# Patient Record
Sex: MALE | Race: WHITE | NOT HISPANIC OR LATINO | Employment: OTHER | URBAN - METROPOLITAN AREA
[De-identification: names, ages, dates, MRNs, and addresses within clinical notes are randomized per-mention and may not be internally consistent; named-entity substitution may affect disease eponyms.]

---

## 2017-01-04 ENCOUNTER — APPOINTMENT (OUTPATIENT)
Dept: RADIOLOGY | Facility: MEDICAL CENTER | Age: 77
End: 2017-01-04
Attending: ORTHOPAEDIC SURGERY
Payer: MEDICARE

## 2017-01-04 PROBLEM — M51.36 DEGENERATION OF LUMBAR INTERVERTEBRAL DISC: Status: ACTIVE | Noted: 2017-01-04

## 2017-01-04 PROBLEM — M48.061 SPINAL STENOSIS, LUMBAR REGION, WITHOUT NEUROGENIC CLAUDICATION: Status: ACTIVE | Noted: 2017-01-04

## 2017-01-04 PROBLEM — M54.50 LUMBAGO: Status: ACTIVE | Noted: 2017-01-04

## 2017-01-04 PROCEDURE — 72020 X-RAY EXAM OF SPINE 1 VIEW: CPT

## 2017-01-04 PROCEDURE — 71010 DX-CHEST-LIMITED (1 VIEW): CPT

## 2019-06-13 ENCOUNTER — APPOINTMENT (OUTPATIENT)
Dept: RADIOLOGY | Facility: MEDICAL CENTER | Age: 79
End: 2019-06-13
Attending: INTERNAL MEDICINE
Payer: MEDICARE

## 2019-06-13 ENCOUNTER — APPOINTMENT (OUTPATIENT)
Dept: RADIOLOGY | Facility: MEDICAL CENTER | Age: 79
End: 2019-06-13
Attending: EMERGENCY MEDICINE
Payer: MEDICARE

## 2019-06-13 ENCOUNTER — APPOINTMENT (OUTPATIENT)
Dept: CARDIOLOGY | Facility: MEDICAL CENTER | Age: 79
End: 2019-06-13
Attending: INTERNAL MEDICINE
Payer: MEDICARE

## 2019-06-13 ENCOUNTER — HOSPITAL ENCOUNTER (OUTPATIENT)
Facility: MEDICAL CENTER | Age: 79
End: 2019-06-14
Attending: EMERGENCY MEDICINE | Admitting: INTERNAL MEDICINE
Payer: MEDICARE

## 2019-06-13 DIAGNOSIS — R42 DIZZINESS: ICD-10-CM

## 2019-06-13 DIAGNOSIS — R55 NEAR SYNCOPE: ICD-10-CM

## 2019-06-13 DIAGNOSIS — H53.9 VISUAL CHANGES: ICD-10-CM

## 2019-06-13 LAB
ALBUMIN SERPL BCP-MCNC: 4.5 G/DL (ref 3.2–4.9)
ALBUMIN/GLOB SERPL: 1.4 G/DL
ALP SERPL-CCNC: 90 U/L (ref 30–99)
ALT SERPL-CCNC: 17 U/L (ref 2–50)
ANION GAP SERPL CALC-SCNC: 12 MMOL/L (ref 0–11.9)
APPEARANCE UR: CLEAR
APTT PPP: 26.3 SEC (ref 24.7–36)
AST SERPL-CCNC: 19 U/L (ref 12–45)
BASOPHILS # BLD AUTO: 0.3 % (ref 0–1.8)
BASOPHILS # BLD: 0.02 K/UL (ref 0–0.12)
BILIRUB SERPL-MCNC: 0.6 MG/DL (ref 0.1–1.5)
BILIRUB UR QL STRIP.AUTO: NEGATIVE
BUN SERPL-MCNC: 18 MG/DL (ref 8–22)
CALCIUM SERPL-MCNC: 9.7 MG/DL (ref 8.5–10.5)
CHLORIDE SERPL-SCNC: 101 MMOL/L (ref 96–112)
CO2 SERPL-SCNC: 27 MMOL/L (ref 20–33)
COLOR UR: YELLOW
CREAT SERPL-MCNC: 1.3 MG/DL (ref 0.5–1.4)
D DIMER PPP IA.FEU-MCNC: 1.02 UG/ML (FEU) (ref 0–0.5)
EKG IMPRESSION: NORMAL
EOSINOPHIL # BLD AUTO: 0.05 K/UL (ref 0–0.51)
EOSINOPHIL NFR BLD: 0.8 % (ref 0–6.9)
ERYTHROCYTE [DISTWIDTH] IN BLOOD BY AUTOMATED COUNT: 45.3 FL (ref 35.9–50)
GLOBULIN SER CALC-MCNC: 3.3 G/DL (ref 1.9–3.5)
GLUCOSE SERPL-MCNC: 95 MG/DL (ref 65–99)
GLUCOSE UR STRIP.AUTO-MCNC: NEGATIVE MG/DL
HCT VFR BLD AUTO: 51.5 % (ref 42–52)
HGB BLD-MCNC: 16.8 G/DL (ref 14–18)
IMM GRANULOCYTES # BLD AUTO: 0.03 K/UL (ref 0–0.11)
IMM GRANULOCYTES NFR BLD AUTO: 0.5 % (ref 0–0.9)
INR PPP: 0.94 (ref 0.87–1.13)
KETONES UR STRIP.AUTO-MCNC: NEGATIVE MG/DL
LEUKOCYTE ESTERASE UR QL STRIP.AUTO: NEGATIVE
LYMPHOCYTES # BLD AUTO: 1.32 K/UL (ref 1–4.8)
LYMPHOCYTES NFR BLD: 20.1 % (ref 22–41)
MAGNESIUM SERPL-MCNC: 2.2 MG/DL (ref 1.5–2.5)
MCH RBC QN AUTO: 29.9 PG (ref 27–33)
MCHC RBC AUTO-ENTMCNC: 32.6 G/DL (ref 33.7–35.3)
MCV RBC AUTO: 91.8 FL (ref 81.4–97.8)
MICRO URNS: NORMAL
MONOCYTES # BLD AUTO: 0.65 K/UL (ref 0–0.85)
MONOCYTES NFR BLD AUTO: 9.9 % (ref 0–13.4)
NEUTROPHILS # BLD AUTO: 4.49 K/UL (ref 1.82–7.42)
NEUTROPHILS NFR BLD: 68.4 % (ref 44–72)
NITRITE UR QL STRIP.AUTO: NEGATIVE
NRBC # BLD AUTO: 0 K/UL
NRBC BLD-RTO: 0 /100 WBC
PH UR STRIP.AUTO: 7 [PH]
PLATELET # BLD AUTO: 223 K/UL (ref 164–446)
PMV BLD AUTO: 8.5 FL (ref 9–12.9)
POTASSIUM SERPL-SCNC: 4.2 MMOL/L (ref 3.6–5.5)
PROT SERPL-MCNC: 7.8 G/DL (ref 6–8.2)
PROT UR QL STRIP: NEGATIVE MG/DL
PROTHROMBIN TIME: 12.7 SEC (ref 12–14.6)
RBC # BLD AUTO: 5.61 M/UL (ref 4.7–6.1)
RBC UR QL AUTO: NEGATIVE
SODIUM SERPL-SCNC: 140 MMOL/L (ref 135–145)
SP GR UR STRIP.AUTO: 1.03
TROPONIN I SERPL-MCNC: <0.01 NG/ML (ref 0–0.04)
UROBILINOGEN UR STRIP.AUTO-MCNC: 0.2 MG/DL
WBC # BLD AUTO: 6.6 K/UL (ref 4.8–10.8)

## 2019-06-13 PROCEDURE — 81003 URINALYSIS AUTO W/O SCOPE: CPT

## 2019-06-13 PROCEDURE — 700117 HCHG RX CONTRAST REV CODE 255: Performed by: INTERNAL MEDICINE

## 2019-06-13 PROCEDURE — 95951 EEG: CPT | Mod: 52 | Performed by: PSYCHIATRY & NEUROLOGY

## 2019-06-13 PROCEDURE — 99285 EMERGENCY DEPT VISIT HI MDM: CPT

## 2019-06-13 PROCEDURE — 93005 ELECTROCARDIOGRAM TRACING: CPT | Performed by: EMERGENCY MEDICINE

## 2019-06-13 PROCEDURE — 700102 HCHG RX REV CODE 250 W/ 637 OVERRIDE(OP): Performed by: INTERNAL MEDICINE

## 2019-06-13 PROCEDURE — 70553 MRI BRAIN STEM W/O & W/DYE: CPT

## 2019-06-13 PROCEDURE — 93880 EXTRACRANIAL BILAT STUDY: CPT | Mod: 26 | Performed by: INTERNAL MEDICINE

## 2019-06-13 PROCEDURE — G0378 HOSPITAL OBSERVATION PER HR: HCPCS

## 2019-06-13 PROCEDURE — 95951 EEG: CPT | Mod: 26,52 | Performed by: PSYCHIATRY & NEUROLOGY

## 2019-06-13 PROCEDURE — 84484 ASSAY OF TROPONIN QUANT: CPT | Mod: 91

## 2019-06-13 PROCEDURE — 93880 EXTRACRANIAL BILAT STUDY: CPT

## 2019-06-13 PROCEDURE — 71045 X-RAY EXAM CHEST 1 VIEW: CPT

## 2019-06-13 PROCEDURE — 71275 CT ANGIOGRAPHY CHEST: CPT

## 2019-06-13 PROCEDURE — 85025 COMPLETE CBC W/AUTO DIFF WBC: CPT

## 2019-06-13 PROCEDURE — 36415 COLL VENOUS BLD VENIPUNCTURE: CPT

## 2019-06-13 PROCEDURE — A9270 NON-COVERED ITEM OR SERVICE: HCPCS | Performed by: INTERNAL MEDICINE

## 2019-06-13 PROCEDURE — 99220 PR INITIAL OBSERVATION CARE,LEVL III: CPT | Performed by: INTERNAL MEDICINE

## 2019-06-13 PROCEDURE — 93005 ELECTROCARDIOGRAM TRACING: CPT

## 2019-06-13 PROCEDURE — 85730 THROMBOPLASTIN TIME PARTIAL: CPT

## 2019-06-13 PROCEDURE — 80053 COMPREHEN METABOLIC PANEL: CPT

## 2019-06-13 PROCEDURE — 83735 ASSAY OF MAGNESIUM: CPT

## 2019-06-13 PROCEDURE — 700105 HCHG RX REV CODE 258: Performed by: INTERNAL MEDICINE

## 2019-06-13 PROCEDURE — 85610 PROTHROMBIN TIME: CPT

## 2019-06-13 PROCEDURE — 70450 CT HEAD/BRAIN W/O DYE: CPT

## 2019-06-13 PROCEDURE — A9585 GADOBUTROL INJECTION: HCPCS | Performed by: INTERNAL MEDICINE

## 2019-06-13 PROCEDURE — 85379 FIBRIN DEGRADATION QUANT: CPT

## 2019-06-13 RX ORDER — ACETAMINOPHEN 325 MG/1
650 TABLET ORAL EVERY 6 HOURS PRN
Status: DISCONTINUED | OUTPATIENT
Start: 2019-06-13 | End: 2019-06-14 | Stop reason: HOSPADM

## 2019-06-13 RX ORDER — BISACODYL 10 MG
10 SUPPOSITORY, RECTAL RECTAL
Status: DISCONTINUED | OUTPATIENT
Start: 2019-06-13 | End: 2019-06-14 | Stop reason: HOSPADM

## 2019-06-13 RX ORDER — ENALAPRILAT 1.25 MG/ML
1.25 INJECTION INTRAVENOUS EVERY 6 HOURS PRN
Status: DISCONTINUED | OUTPATIENT
Start: 2019-06-13 | End: 2019-06-14 | Stop reason: HOSPADM

## 2019-06-13 RX ORDER — ONDANSETRON 2 MG/ML
4 INJECTION INTRAMUSCULAR; INTRAVENOUS EVERY 4 HOURS PRN
Status: DISCONTINUED | OUTPATIENT
Start: 2019-06-13 | End: 2019-06-14 | Stop reason: HOSPADM

## 2019-06-13 RX ORDER — ONDANSETRON 4 MG/1
4 TABLET, ORALLY DISINTEGRATING ORAL EVERY 4 HOURS PRN
Status: DISCONTINUED | OUTPATIENT
Start: 2019-06-13 | End: 2019-06-14 | Stop reason: HOSPADM

## 2019-06-13 RX ORDER — SODIUM CHLORIDE, SODIUM LACTATE, POTASSIUM CHLORIDE, AND CALCIUM CHLORIDE .6; .31; .03; .02 G/100ML; G/100ML; G/100ML; G/100ML
1000 INJECTION, SOLUTION INTRAVENOUS ONCE
Status: COMPLETED | OUTPATIENT
Start: 2019-06-13 | End: 2019-06-13

## 2019-06-13 RX ORDER — DIMENHYDRINATE 50 MG
50 TABLET ORAL EVERY 6 HOURS
Status: ON HOLD | COMMUNITY
End: 2019-06-14

## 2019-06-13 RX ORDER — CETIRIZINE HYDROCHLORIDE 10 MG/1
10 TABLET ORAL 2 TIMES DAILY PRN
COMMUNITY

## 2019-06-13 RX ORDER — POLYETHYLENE GLYCOL 3350 17 G/17G
1 POWDER, FOR SOLUTION ORAL
Status: DISCONTINUED | OUTPATIENT
Start: 2019-06-13 | End: 2019-06-14 | Stop reason: HOSPADM

## 2019-06-13 RX ORDER — SODIUM CHLORIDE, SODIUM LACTATE, POTASSIUM CHLORIDE, CALCIUM CHLORIDE 600; 310; 30; 20 MG/100ML; MG/100ML; MG/100ML; MG/100ML
INJECTION, SOLUTION INTRAVENOUS CONTINUOUS
Status: DISCONTINUED | OUTPATIENT
Start: 2019-06-13 | End: 2019-06-14 | Stop reason: HOSPADM

## 2019-06-13 RX ORDER — AMOXICILLIN 250 MG
2 CAPSULE ORAL 2 TIMES DAILY
Status: DISCONTINUED | OUTPATIENT
Start: 2019-06-13 | End: 2019-06-14 | Stop reason: HOSPADM

## 2019-06-13 RX ORDER — MECLIZINE HYDROCHLORIDE 25 MG/1
25 TABLET ORAL 3 TIMES DAILY
Status: DISCONTINUED | OUTPATIENT
Start: 2019-06-13 | End: 2019-06-14 | Stop reason: HOSPADM

## 2019-06-13 RX ORDER — GADOBUTROL 604.72 MG/ML
7.5 INJECTION INTRAVENOUS ONCE
Status: COMPLETED | OUTPATIENT
Start: 2019-06-13 | End: 2019-06-13

## 2019-06-13 RX ADMIN — GADOBUTROL 7.5 ML: 604.72 INJECTION INTRAVENOUS at 15:46

## 2019-06-13 RX ADMIN — MECLIZINE HYDROCHLORIDE 25 MG: 25 TABLET ORAL at 18:12

## 2019-06-13 RX ADMIN — IOHEXOL 75 ML: 350 INJECTION, SOLUTION INTRAVENOUS at 14:00

## 2019-06-13 RX ADMIN — SODIUM CHLORIDE, POTASSIUM CHLORIDE, SODIUM LACTATE AND CALCIUM CHLORIDE 1000 ML: 600; 310; 30; 20 INJECTION, SOLUTION INTRAVENOUS at 11:55

## 2019-06-13 RX ADMIN — SODIUM CHLORIDE, POTASSIUM CHLORIDE, SODIUM LACTATE AND CALCIUM CHLORIDE: 600; 310; 30; 20 INJECTION, SOLUTION INTRAVENOUS at 13:00

## 2019-06-13 RX ADMIN — SODIUM CHLORIDE, POTASSIUM CHLORIDE, SODIUM LACTATE AND CALCIUM CHLORIDE: 600; 310; 30; 20 INJECTION, SOLUTION INTRAVENOUS at 23:57

## 2019-06-13 ASSESSMENT — PATIENT HEALTH QUESTIONNAIRE - PHQ9
2. FEELING DOWN, DEPRESSED, IRRITABLE, OR HOPELESS: NOT AT ALL
SUM OF ALL RESPONSES TO PHQ9 QUESTIONS 1 AND 2: 0
1. LITTLE INTEREST OR PLEASURE IN DOING THINGS: NOT AT ALL

## 2019-06-13 ASSESSMENT — LIFESTYLE VARIABLES
EVER_SMOKED: YES
EVER_SMOKED: YES
DO YOU DRINK ALCOHOL: NO

## 2019-06-13 NOTE — PROCEDURES
VIDEO ELECTROENCEPHALOGRAM REPORT      Referring provider: Dr. Ewing.    DOS: 6/13/2019 (total recording of 29 minutes).     INDICATION:  Tao Petit 79 y.o. male presenting with syncope, dizziness.     CURRENT ANTIEPILEPTIC REGIMEN: None.     TECHNIQUE: 30 channel video electroencephalogram (EEG) was performed in accordance with the international 10-20 system. The study was reviewed in bipolar and referential montages. The recording examined the patient during wakeful and drowsy state(s).     DESCRIPTION OF THE RECORD:  During the wakefulness, the background showed a symmetrical 12 Hz alpha activity posteriorly with amplitude of 70 mV.  There was reactivity to eye closure/opening.  A normal anterior-posterior gradient was noted with faster beta frequencies seen anteriorly.  During drowsiness, theta frequencies were seen.    ACTIVATION PROCEDURES:   Intermittent Photic stimulation was performed in a stepwise fashion from 1 to 30 Hz and elicited a normal response (photic driving), most noticeable in the posterior leads.    ICTAL AND/OR INTERICTAL FINDINGS:   No focal or generalized epileptiform activity noted. No regional slowing was seen during this routine study.  No clinical events or seizures were reported or recorded during the study.     EKG: sampling of the EKG recording demonstrated a mild sinus arhythmia.     EVENTS:  None.     INTERPRETATION:  This is a normal video EEG recording in the awake and drowsy state(s).  Clinical correlation is recommended.    Note: A normal EEG does not rule out epilepsy.  If the clinical suspicion remains high for seizures, a prolonged recording to capture clinical or subclinical events may be helpful.    A MILD SINUS ARRHYTHMIA WAS CAPTURED ON EKG LEAD.       Denilson Carlin MD   Epilepsy and Neurodiagnostics.   Clinical  of Neurology [facility].   Diplomate in Neurology, Epilepsy, and Electrodiagnostic  Medicine.   Office: [phone]  Fax: [phone]

## 2019-06-13 NOTE — ED NOTES
Med Rec completed per patient  Allergies reviewed  No ORAL antibiotics in last 14 days    Patient states that he does not take any prescription medications at home

## 2019-06-13 NOTE — PROGRESS NOTES
Assessment completed. Pt A&Ox4. Respirations are even and unlabored on RA. Pt denies pain at this time. Monitors applied, +orthostatic BP (notified Dr. Ewing), call light and belongings within reach. POC updated (echo, EEG, carotid, CT chest, PT/OT, labs, fluids, urine). Pt educated on room and call light, pt verbalized understanding. Communication board updated. Needs met. Boxed lunch provided.

## 2019-06-13 NOTE — H&P
Hospital Medicine History & Physical Note    Date of Service  6/13/2019    Primary Care Physician  Pcp Pt States None    Consultants  none    Code Status  Full    Chief Complaint  Syncope, dizziness    History of Presenting Illness  79 y.o. Male aleks, who has worked in the [employer] for as long as he can remember, and unfortunately has had multiple head traumas and falls due to his line of work, resulting to being diagnosed with Ménière's disease about 20 years ago.  He states he had attacks of vertigo, with total deafness of the left ear and 50% hearing loss on the right after treatment for Ménière's disease.    According to the patient, and his daughter, in the last 1 week, the patient was feeling very dizzy, with sensation of floating, and head congestion, along with more frequent vertigo worse with head movement and rising fast. He was also noted to have poor balance.  He had one episodes of syncope, where he totally blacked out, but the patient could not remember this.  The daughter also noticed that he has been having more frequent mood swings, and has been angrier than usual.  He denies any focal weakness or numbness, or any speech changes.  Otherwise, he does not have any other complaints such as fevers, chills abdominal pain, dysuria, diarrhea, chest pain, shortness of breath, or cough.  He states he eats and drinks well.  Due to the prodding of his daughter and friend, he then decided to go to the ED to be evaluated.    ED course:  The patient was initially evaluated.  Vital signs are stable.  Blood work-up are unremarkable.  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  Chest x-ray (my read) showed no consolidation or effusion.  EKG showed normal sinus rhythm (personally reviewed).  Patient was subsequently admitted for further evaluation.    Review of Systems  ROS     Pertinent positives/negatives as mentioned above.     A complete review of  systems was personally done by me. All other systems were negative.       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    Surgical History   has a past surgical history that includes chest tube insertion (2/2013); appendectomy (1956); inguinal hernia repair (1960's); irrigation & debridement ortho (Left, 8/3/2016); and lumbar decompression (1/4/2017).     Family History  Reviewed. Non-contributory.       Social History   reports that he quit smoking about 54 years ago. His smokeless tobacco use includes Chew. He reports that he drinks about 3.5 oz of alcohol per week . He reports that he does not use drugs.    Allergies  Allergies   Allergen Reactions   • Oxycodone Unspecified     dizzy   • Percocet [Apap-Fd&C Red #40 Al Ramirez-Oxycodone]        Medications  Prior to Admission Medications   Prescriptions Last Dose Informant Patient Reported? Taking?   Multiple Vitamins-Minerals (CENTRUM SILVER 50+MEN) Tab FEW DAYS AGO at UNK Patient Yes Yes   Sig: Take 1 Tab by mouth every day.   cetirizine (ZYRTEC) 10 MG Tab 6/12/2019 at AM Patient Yes Yes   Sig: Take 10 mg by mouth 2 times a day as needed for Allergies.   dimenhyDRINATE (DRAMAMINE) 50 MG Tab 6/12/2019 at PRN Patient Yes Yes   Sig: Take 50 mg by mouth every 6 hours.      Facility-Administered Medications: None       Physical Exam  Temp:  [36.6 °C (97.9 °F)] 36.6 °C (97.9 °F)  Pulse:  [76-96] 76  Resp:  [14-31] 31  BP: (149)/(97) 149/97  SpO2:  [92 %-96 %] 92 %    Physical Exam   Constitutional: He is oriented to person, place, and time. He appears well-developed and well-nourished. No distress.   HENT:   Head: Normocephalic and atraumatic.   Mouth/Throat: Oropharynx is clear and moist. No oropharyngeal exudate.   Eyes: Pupils are equal, round, and reactive to light.  EOM are normal. Right eye exhibits no discharge. Left eye exhibits no discharge. No scleral icterus.   Neck: Normal range of motion. Neck supple.   Cardiovascular: Normal rate and regular rhythm.  Exam reveals no gallop and no friction rub.    No murmur heard.  Pulmonary/Chest: Effort normal and breath sounds normal. He has no wheezes. He has no rales. He exhibits no tenderness.   Abdominal: Soft. Bowel sounds are normal. There is no tenderness. There is no rebound and no guarding.   Musculoskeletal: Normal range of motion. He exhibits deformity ( Missing left thumb). He exhibits no edema or tenderness.   Lymphadenopathy:     He has no cervical adenopathy.   Neurological: He is alert and oriented to person, place, and time. No cranial nerve deficit.   No pronator drift  No facial droop  Motor 5/5 in all 4 extremity  Sensory grossly intact   Skin: Skin is warm and dry. No rash noted. He is not diaphoretic. No erythema.   Psychiatric: He has a normal mood and affect. His behavior is normal. Thought content normal.   Vitals reviewed.      Laboratory:  Recent Labs      06/13/19   0906   WBC  6.6   RBC  5.61   HEMOGLOBIN  16.8   HEMATOCRIT  51.5   MCV  91.8   MCH  29.9   MCHC  32.6*   RDW  45.3   PLATELETCT  223   MPV  8.5*     Recent Labs      06/13/19   0906   SODIUM  140   POTASSIUM  4.2   CHLORIDE  101   CO2  27   GLUCOSE  95   BUN  18   CREATININE  1.30   CALCIUM  9.7     Recent Labs      06/13/19   0906   ALTSGPT  17   ASTSGOT  19   ALKPHOSPHAT  90   TBILIRUBIN  0.6   GLUCOSE  95     Recent Labs      06/13/19   0906   APTT  26.3   INR  0.94             Recent Labs      06/13/19   0906   TROPONINI  <0.01       Urinalysis:    No results found     Imaging:  DX-CHEST-PORTABLE (1 VIEW)   Final Result      No consolidation.      CT-HEAD W/O   Final Result      1.  There is no acute intracranial process.   2.  There is a stable incidental right middle cranial fossa arachnoid cyst.   3.  There is mild age-related cerebral  "atrophy and white matter disease.      EC-ECHOCARDIOGRAM COMPLETE W/O CONT    (Results Pending)   MR-BRAIN-WITH    (Results Pending)   US-CAROTID DOPPLER BILAT    (Results Pending)         Assessment/Plan:  I anticipate this patient is appropriate for observation status at this time.    * Syncope, dizziness- (present on admission)   Assessment & Plan    -Unclear etiology, but I strongly suspect that this is due to his underlying Ménière's disease.  He did pass out once, with some component of lightheadedness, and so I would further work him up for other causes.  -I will obtain an MRI of the brain to rule out small stroke, and to reevaluate his arachnoid cyst.  I will also obtain an EEG.  For his syncope, I will obtain an echocardiogram, and carotid ultrasound, and check troponins and d-dimer.  -I will obtain an orthostatic vital signs.  For now I will hydrate him with lactated Ringer at 125 cc/h.  I will also start him on scheduled meclizine, to see if this will help control his vertigo.  -We will monitor him on telemetry to rule out any arrhythmia.  -I will get PT and OT to evaluate him.     Meniere disease, bilateral- (present on admission)   Assessment & Plan    - He takes over-the-counter \" seasick pills\", and had treatment which \" killed\" his left ear.  Suspect this is likely due to multiple head trauma from being in the [place].  -Start scheduled meclizine TID.  -PT/OT eval.          VTE prophylaxis: SCD  "

## 2019-06-13 NOTE — ASSESSMENT & PLAN NOTE
"- He takes over-the-counter \" seasick pills\", and had treatment which \" killed\" his left ear.  Suspect this is likely due to multiple head trauma from being in the [place].  -Start scheduled meclizine TID.  -PT/OT eval.   "

## 2019-06-13 NOTE — ED PROVIDER NOTES
"ED Provider Note    CHIEF COMPLAINT  Chief Complaint   Patient presents with   • Near Syncopal       HPI  Tao Petit is a 79 y.o. male who ambulates to the emergency department through triage with a friend for near syncopal episodes.  Patient comes with a narrative from his daughter who was unable to attend due to work, patient concurs with most in this narrative which describes although somewhat chronic history for intermittent vertigo, patient with 10 days of increasing dizziness, lightheadedness and near syncope.  Often catching himself before he falls when changing positions or standing up.  Patient describes blurred vision, difficulty focusing.  Denies headache, neck pain.  However patient states that he has moments where he has some sensation is bringing the induces vertigo and almost a stiffening motion.  Denies slurred speech or aphasia.  Denies extremity weakness or paresthesias.  Denies chest pain, palpitations or shortness of breath.  Denies abdominal pain or back pain.    Reported history of a \"tumor or cyst behind his eye\" initially diagnosed early 2000's, surgery recommended but patient and his wife declined.    REVIEW OF SYSTEMS  See HPI for further details. All other systems are negative.     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    SOCIAL HISTORY  Social History     Social History Main Topics   • Smoking status: Former Smoker     Quit date: 3/1/1965   • Smokeless tobacco: Current User     Types: Chew   • Alcohol use 3.5 oz/week     7 Cans of beer per week      Comment: 1 beer per night   • Drug use: No   • Sexual activity: Not on file      Comment:        SURGICAL HISTORY   has a past surgical history that includes chest tube insertion (2/2013); appendectomy (1956); inguinal " hernia repair (1960's); irrigation & debridement ortho (Left, 8/3/2016); and lumbar decompression (1/4/2017).    CURRENT MEDICATIONS  Home Medications     Reviewed by Ian Dimas (Pharmacy Tech) on 06/13/19 at 1044  Med List Status: Complete   Medication Last Dose Status   cetirizine (ZYRTEC) 10 MG Tab 6/12/2019 Active   dimenhyDRINATE (DRAMAMINE) 50 MG Tab 6/12/2019 Active   Multiple Vitamins-Minerals (CENTRUM SILVER 50+MEN) Tab FEW DAYS AGO Active                ALLERGIES  Allergies   Allergen Reactions   • Oxycodone Unspecified     dizzy   • Percocet [Apap-Fd&C Red #40 Al Ramirez-Oxycodone]        PHYSICAL EXAM  VITAL SIGNS: /97   Pulse 76   Temp 36.6 °C (97.9 °F) (Temporal)   Resp (!) 31   Ht 1.829 m (6')   Wt 81.6 kg (180 lb)   SpO2 92%   BMI 24.41 kg/m²   Pulse ox interpretation: I interpret this pulse ox as normal.  Constitutional: Alert in no apparent distress.  HENT: Normocephalic, atraumatic. Bilateral external ears normal, Nose normal. Moist mucous membranes.    Eyes: Pupils are equal and reactive, Conjunctiva normal.  EOMI.  No nystagmus.  Neck: Normal range of motion, Supple, non-tender.  No meningeal irritation.  Lymphatic: No lymphadenopathy noted.   Cardiovascular: Regular rate and rhythm, no murmurs. Distal pulses intact.    Thorax & Lungs: Normal breath sounds.  No wheezing/rales/ronchi. No increased work of breathing  Abdomen: Soft, non-distended, non-tender to palpation. No palpable or pulsatile masses.  Skin: Warm, Dry  Musculoskeletal: Good range of motion in all major joints.   Neurologic: Alert and oriented x4.  Speech clear and cohesive.  Cranial nerves II through XII intact bilaterally.  5 out of 5 strength in 4 extremities with proximal distal muscle groups.  No pronator drift.  Heel-to-shin intact bilaterally.  Psychiatric: Flat affect otherwise cooperative and judgment normal, Mood normal.       DIAGNOSTIC STUDIES / PROCEDURES  LABS  Results for orders placed or  performed during the hospital encounter of 06/13/19   CBC WITH DIFFERENTIAL   Result Value Ref Range    WBC 6.6 4.8 - 10.8 K/uL    RBC 5.61 4.70 - 6.10 M/uL    Hemoglobin 16.8 14.0 - 18.0 g/dL    Hematocrit 51.5 42.0 - 52.0 %    MCV 91.8 81.4 - 97.8 fL    MCH 29.9 27.0 - 33.0 pg    MCHC 32.6 (L) 33.7 - 35.3 g/dL    RDW 45.3 35.9 - 50.0 fL    Platelet Count 223 164 - 446 K/uL    MPV 8.5 (L) 9.0 - 12.9 fL    Neutrophils-Polys 68.40 44.00 - 72.00 %    Lymphocytes 20.10 (L) 22.00 - 41.00 %    Monocytes 9.90 0.00 - 13.40 %    Eosinophils 0.80 0.00 - 6.90 %    Basophils 0.30 0.00 - 1.80 %    Immature Granulocytes 0.50 0.00 - 0.90 %    Nucleated RBC 0.00 /100 WBC    Neutrophils (Absolute) 4.49 1.82 - 7.42 K/uL    Lymphs (Absolute) 1.32 1.00 - 4.80 K/uL    Monos (Absolute) 0.65 0.00 - 0.85 K/uL    Eos (Absolute) 0.05 0.00 - 0.51 K/uL    Baso (Absolute) 0.02 0.00 - 0.12 K/uL    Immature Granulocytes (abs) 0.03 0.00 - 0.11 K/uL    NRBC (Absolute) 0.00 K/uL   COMP METABOLIC PANEL   Result Value Ref Range    Sodium 140 135 - 145 mmol/L    Potassium 4.2 3.6 - 5.5 mmol/L    Chloride 101 96 - 112 mmol/L    Co2 27 20 - 33 mmol/L    Anion Gap 12.0 (H) 0.0 - 11.9    Glucose 95 65 - 99 mg/dL    Bun 18 8 - 22 mg/dL    Creatinine 1.30 0.50 - 1.40 mg/dL    Calcium 9.7 8.5 - 10.5 mg/dL    AST(SGOT) 19 12 - 45 U/L    ALT(SGPT) 17 2 - 50 U/L    Alkaline Phosphatase 90 30 - 99 U/L    Total Bilirubin 0.6 0.1 - 1.5 mg/dL    Albumin 4.5 3.2 - 4.9 g/dL    Total Protein 7.8 6.0 - 8.2 g/dL    Globulin 3.3 1.9 - 3.5 g/dL    A-G Ratio 1.4 g/dL   TROPONIN   Result Value Ref Range    Troponin I <0.01 0.00 - 0.04 ng/mL   PROTHROMBIN TIME   Result Value Ref Range    PT 12.7 12.0 - 14.6 sec    INR 0.94 0.87 - 1.13   APTT   Result Value Ref Range    APTT 26.3 24.7 - 36.0 sec   ESTIMATED GFR   Result Value Ref Range    GFR If African American >60 >60 mL/min/1.73 m 2    GFR If Non  53 (A) >60 mL/min/1.73 m 2   EKG   Result Value Ref Range     Report       [facility] Emergency Dept.    Test Date:  2019  Pt Name:    FELIX DOMINGUEZ             Department: ER  MRN:        [id number]                      Room:        06  Gender:     Male                         Technician: [id number]  :        [id number]                   Requested By:ER TRIAGE PROTOCOL  Order #:    [id number]                    Reading MD: JED MORALES DO    Measurements  Intervals                                Axis  Rate:       90                           P:          66  ME:         148                          QRS:        46  QRSD:       88                           T:          71  QT:         352  QTc:        431    Interpretive Statements  SINUS RHYTHM  Compared to ECG 2017 10:44:56  No significant changes    Electronically Signed On 2019 11:13:54 PDT by JED MORALES DO       RADIOLOGY  DX-CHEST-PORTABLE (1 VIEW)   Final Result      No consolidation.      CT-HEAD W/O   Final Result      1.  There is no acute intracranial process.   2.  There is a stable incidental right middle cranial fossa arachnoid cyst.   3.  There is mild age-related cerebral atrophy and white matter disease.      EC-ECHOCARDIOGRAM COMPLETE W/O CONT    (Results Pending)       COURSE & MEDICAL DECISION MAKING  Nursing notes and vital signs were reviewed. (See chart for details)  The patients records were reviewed, history was obtained from the patient;    ED evaluation for recurrent near syncope and dizziness is unrevealing.  Physical exam is quite unremarkable, although patient is symptomatic when going from lying to sitting or with sudden head movements, he is otherwise neurologically intact and nonfocal.  No nystagmus.  Labs are unremarkable.  CT head is within normal limits and unchanged from previous.  EKG within normal limits, continues telemetry without ectopy or arrhythmia.  Chest x-ray within normal limits.  Symptomatology, although with distant history for  somewhat of the same, acutely worse in the last 10 days.  Cannot exclude benign positional vertigo, however given age and symptomatology I feel as though further evaluation is warranted.  Cardiac evaluation, continuous monitoring and possible even MRI of the brain.  Patient will be admitted to the hospitalist for further evaluation and treatment.  He and his family member aware the findings and agreeable to the disposition plan.    1105 - Dr. Ewing is aware the patient agreeable to admission.    FINAL IMPRESSION  (R55) Near syncope, recurrent  (H53.9) Visual changes  (R42) Dizziness      Electronically signed by: Chasity Pierce, 6/13/2019 9:25 AM      This dictation was created using voice recognition software. The accuracy of the dictation is limited to the abilities of the software. I expect there may be some errors of grammar and possibly content. The nursing notes were reviewed and certain aspects of this information were incorporated into this note.

## 2019-06-13 NOTE — ED NOTES
"Agree with triage assessment, no changes noted. Pt reports \"I've been having vision problems and dizziness for 10 days.\" Pt reports pain of 0 out of 10. Pt hooked to monitor. Pt denies any further needs at this time. Call light within reach. Bed in low locked position. Comfort measures provided.   "

## 2019-06-13 NOTE — ED TRIAGE NOTES
"Pt bib friend. Pt has had near syncopal episodes for approx 1 week. Pt's daughter wrote a note regarding pt's symptoms. 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 Pt also with hx of possible tumor behind right eye. Pt A&Ox4.   "

## 2019-06-13 NOTE — CARE PLAN
Problem: Knowledge Deficit  Goal: Knowledge of disease process/condition, treatment plan, diagnostic tests, and medications will improve  Outcome: PROGRESSING AS EXPECTED  Updated on POC.

## 2019-06-13 NOTE — DISCHARGE PLANNING
Care Transition Team Assessment    Spoke with patient at bedside Anticipate no needs @ present time. Friend will be ride @ D/C.    Information Source  Orientation : Oriented x 4  Information Given By: Patient    Readmission Evaluation  Is this a readmission?: No    Interdisciplinary Discharge Planning  Does Admitting Nurse Feel This Could be a Complex Discharge?: No  Primary Care Physician: None  Lives with - Patient's Self Care Capacity: Alone and Able to Care For Self  Support Systems: Children, Friends / Neighbors  Housing / Facility: Other (Comments) (5th wheel)  Do You Take your Prescribed Medications Regularly: No  Reasons Why Not Taking Medications :  (No PCP)  Able to Return to Previous ADL's: Yes  Mobility Issues: No  Prior Services: Home-Independent  Patient Expects to be Discharged to:: Home  Assistance Needed: No  Durable Medical Equipment: Not Applicable    Discharge Preparedness  What are your discharge supports?: Child, Other (comment) (Friend)  Prior Functional Level: Ambulatory    Functional Assesment  Prior Functional Level: Ambulatory    Finances  Financial Barriers to Discharge: No  Prescription Coverage: Yes    Anticipated Discharge Information  Anticipated discharge disposition: Home  Discharge Address: [address]  Discharge Contact Phone Number: [phone]

## 2019-06-13 NOTE — PROGRESS NOTES
Orthostatics:    Supine 172/89 Pulse 83  Sitting 146/86 Pulse 83  Standing 135/74 Pulse 88    Pt reported dizziness upon sitting and standing.

## 2019-06-13 NOTE — ASSESSMENT & PLAN NOTE
-Unclear etiology, but I strongly suspect that this is due to his underlying Ménière's disease.  He did pass out once, with some component of lightheadedness, and so I would further work him up for other causes.  -I will obtain an MRI of the brain to rule out small stroke, and to reevaluate his arachnoid cyst.  I will also obtain an EEG.  For his syncope, I will obtain an echocardiogram, and carotid ultrasound, and check troponins and d-dimer.  -I will obtain an orthostatic vital signs.  For now I will hydrate him with lactated Ringer at 125 cc/h.  I will also start him on scheduled meclizine, to see if this will help control his vertigo.  -We will monitor him on telemetry to rule out any arrhythmia.  -I will get PT and OT to evaluate him.   epigastric area

## 2019-06-14 ENCOUNTER — TELEPHONE (OUTPATIENT)
Dept: NEUROLOGY | Facility: MEDICAL CENTER | Age: 79
End: 2019-06-14

## 2019-06-14 ENCOUNTER — PATIENT OUTREACH (OUTPATIENT)
Dept: HEALTH INFORMATION MANAGEMENT | Facility: OTHER | Age: 79
End: 2019-06-14

## 2019-06-14 ENCOUNTER — APPOINTMENT (OUTPATIENT)
Dept: CARDIOLOGY | Facility: MEDICAL CENTER | Age: 79
End: 2019-06-14
Attending: INTERNAL MEDICINE
Payer: MEDICARE

## 2019-06-14 VITALS
HEIGHT: 72 IN | TEMPERATURE: 98.9 F | WEIGHT: 185.41 LBS | BODY MASS INDEX: 25.11 KG/M2 | OXYGEN SATURATION: 95 % | DIASTOLIC BLOOD PRESSURE: 62 MMHG | RESPIRATION RATE: 15 BRPM | HEART RATE: 100 BPM | SYSTOLIC BLOOD PRESSURE: 147 MMHG

## 2019-06-14 PROBLEM — I95.1 ORTHOSTATIC HYPOTENSION: Status: RESOLVED | Noted: 2019-06-14 | Resolved: 2019-06-14

## 2019-06-14 PROBLEM — R55 SYNCOPE: Status: RESOLVED | Noted: 2019-06-13 | Resolved: 2019-06-14

## 2019-06-14 PROBLEM — I95.1 ORTHOSTATIC HYPOTENSION: Status: ACTIVE | Noted: 2019-06-14

## 2019-06-14 LAB
ANION GAP SERPL CALC-SCNC: 5 MMOL/L (ref 0–11.9)
BASOPHILS # BLD AUTO: 0.4 % (ref 0–1.8)
BASOPHILS # BLD: 0.02 K/UL (ref 0–0.12)
BUN SERPL-MCNC: 16 MG/DL (ref 8–22)
CALCIUM SERPL-MCNC: 8.5 MG/DL (ref 8.5–10.5)
CHLORIDE SERPL-SCNC: 107 MMOL/L (ref 96–112)
CO2 SERPL-SCNC: 24 MMOL/L (ref 20–33)
CREAT SERPL-MCNC: 1.11 MG/DL (ref 0.5–1.4)
EOSINOPHIL # BLD AUTO: 0.11 K/UL (ref 0–0.51)
EOSINOPHIL NFR BLD: 1.9 % (ref 0–6.9)
ERYTHROCYTE [DISTWIDTH] IN BLOOD BY AUTOMATED COUNT: 44.3 FL (ref 35.9–50)
GLUCOSE SERPL-MCNC: 107 MG/DL (ref 65–99)
HCT VFR BLD AUTO: 41.1 % (ref 42–52)
HGB BLD-MCNC: 13.7 G/DL (ref 14–18)
IMM GRANULOCYTES # BLD AUTO: 0.01 K/UL (ref 0–0.11)
IMM GRANULOCYTES NFR BLD AUTO: 0.2 % (ref 0–0.9)
LV EJECT FRACT  99904: 60
LV EJECT FRACT MOD 2C 99903: 70.27
LV EJECT FRACT MOD 4C 99902: 51.65
LV EJECT FRACT MOD BP 99901: 62.08
LYMPHOCYTES # BLD AUTO: 1.57 K/UL (ref 1–4.8)
LYMPHOCYTES NFR BLD: 27.6 % (ref 22–41)
MCH RBC QN AUTO: 30.4 PG (ref 27–33)
MCHC RBC AUTO-ENTMCNC: 33.3 G/DL (ref 33.7–35.3)
MCV RBC AUTO: 91.1 FL (ref 81.4–97.8)
MONOCYTES # BLD AUTO: 0.56 K/UL (ref 0–0.85)
MONOCYTES NFR BLD AUTO: 9.8 % (ref 0–13.4)
NEUTROPHILS # BLD AUTO: 3.42 K/UL (ref 1.82–7.42)
NEUTROPHILS NFR BLD: 60.1 % (ref 44–72)
NRBC # BLD AUTO: 0 K/UL
NRBC BLD-RTO: 0 /100 WBC
PLATELET # BLD AUTO: 171 K/UL (ref 164–446)
PMV BLD AUTO: 8.4 FL (ref 9–12.9)
POTASSIUM SERPL-SCNC: 4 MMOL/L (ref 3.6–5.5)
RBC # BLD AUTO: 4.51 M/UL (ref 4.7–6.1)
SODIUM SERPL-SCNC: 136 MMOL/L (ref 135–145)
WBC # BLD AUTO: 5.7 K/UL (ref 4.8–10.8)

## 2019-06-14 PROCEDURE — 93306 TTE W/DOPPLER COMPLETE: CPT | Mod: 26 | Performed by: INTERNAL MEDICINE

## 2019-06-14 PROCEDURE — 700105 HCHG RX REV CODE 258: Performed by: NURSE PRACTITIONER

## 2019-06-14 PROCEDURE — 700102 HCHG RX REV CODE 250 W/ 637 OVERRIDE(OP): Performed by: INTERNAL MEDICINE

## 2019-06-14 PROCEDURE — 99217 PR OBSERVATION CARE DISCHARGE: CPT | Performed by: INTERNAL MEDICINE

## 2019-06-14 PROCEDURE — 80048 BASIC METABOLIC PNL TOTAL CA: CPT

## 2019-06-14 PROCEDURE — 85025 COMPLETE CBC W/AUTO DIFF WBC: CPT

## 2019-06-14 PROCEDURE — 93306 TTE W/DOPPLER COMPLETE: CPT

## 2019-06-14 PROCEDURE — G0378 HOSPITAL OBSERVATION PER HR: HCPCS

## 2019-06-14 PROCEDURE — A9270 NON-COVERED ITEM OR SERVICE: HCPCS | Performed by: INTERNAL MEDICINE

## 2019-06-14 PROCEDURE — 97162 PT EVAL MOD COMPLEX 30 MIN: CPT

## 2019-06-14 RX ORDER — SODIUM CHLORIDE 9 MG/ML
1000 INJECTION, SOLUTION INTRAVENOUS ONCE
Status: COMPLETED | OUTPATIENT
Start: 2019-06-14 | End: 2019-06-14

## 2019-06-14 RX ORDER — MECLIZINE HCL 12.5 MG/1
12.5 TABLET ORAL 3 TIMES DAILY
Qty: 90 TAB | Refills: 1 | Status: SHIPPED | OUTPATIENT
Start: 2019-06-14 | End: 2019-06-20

## 2019-06-14 RX ORDER — DIMENHYDRINATE 50 MG
50 TABLET ORAL EVERY 6 HOURS
Qty: 60 TAB | Refills: 0 | Status: SHIPPED | OUTPATIENT
Start: 2019-06-14

## 2019-06-14 RX ADMIN — MECLIZINE HYDROCHLORIDE 25 MG: 25 TABLET ORAL at 16:22

## 2019-06-14 RX ADMIN — SODIUM CHLORIDE 1000 ML: 9 INJECTION, SOLUTION INTRAVENOUS at 10:33

## 2019-06-14 ASSESSMENT — COGNITIVE AND FUNCTIONAL STATUS - GENERAL
MOBILITY SCORE: 24
SUGGESTED CMS G CODE MODIFIER MOBILITY: CH

## 2019-06-14 ASSESSMENT — GAIT ASSESSMENTS
DISTANCE (FEET): 500
DEVIATION: OTHER (COMMENT)
GAIT LEVEL OF ASSIST: SUPERVISED

## 2019-06-14 NOTE — THERAPY
"Physical Therapy Evaluation completed.   Bed Mobility:  Supine to Sit: Supervised  Transfers: Sit to Stand: Supervised  Gait: Level Of Assist: Supervised with No Equipment Needed       Plan of Care: Patient with no further skilled PT needs in the acute care setting at this time  Discharge Recommendations: Equipment: No Equipment Needed. Post-acute therapy: see below.    See \"Rehab Therapy-Acute\" Patient Summary Report for complete documentation.    Patient is a 78 YO male that presented following complaints of dizziness. PMHx significant for several head traumas and falls, Meniere's disease. He ambulated approximately 500ft without AD and supervision and ascended/descended one flight of stairs with rail, reciprocal gait, and supervision. He reported feeling dizzy upon moving supine>sitting, no other complaints of dizziness/lightheadedness during session. He appears to be near baseline functional mobility and demonstrated good safety awareness and insight; plans to utilize daughter for transport until symptoms resolved. Provided patient education regarding compensatory strategies for orthostatic hypotension. Anticipate patient will be able to return home following medical clearance. No further acute PT needs.  "

## 2019-06-14 NOTE — PROGRESS NOTES
Assessment complete.  Neuro intact except for dizziness while sitting up and laying back down in bed per pt.  Urine sent to lab. All questions answered at this time.  Tele: NSR, Oxygen:RA. Call light within reach.

## 2019-06-14 NOTE — DISCHARGE INSTRUCTIONS
Discharge Instructions per Liam Ewing M.D.    --- you are prescribed 2 medications - meclizine and dramamine. Only take 1 kind of pill at a time to see what works better.    --- Change positions very slowly.   --- seek immediate medical attention, or return to the ED for recurrent or worsening symptoms.  --- follow-up with you primary doctor.           Discharge Instructions    Discharged to home by car with relative. Discharged via wheelchair, hospital escort: Yes.  Special equipment needed: Not Applicable    Be sure to schedule a follow-up appointment with your primary care doctor or any specialists as instructed.     Discharge Plan:   Diet Plan: Discussed  Activity Level: Discussed  Confirmed Follow up Appointment: Patient to Call and Schedule Appointment  Confirmed Symptoms Management: Discussed  Medication Reconciliation Updated: Yes  Pneumococcal Vaccine Administered/Refused: Not given - Patient refused pneumococcal vaccine  Influenza Vaccine Indication: Indicated: Not available from distributor/    I understand that a diet low in cholesterol, fat, and sodium is recommended for good health. Unless I have been given specific instructions below for another diet, I accept this instruction as my diet prescription.   Other diet: heart healthy    Special Instructions: None    · Is patient discharged on Warfarin / Coumadin?   No         Depression / Suicide Risk    As you are discharged from this [facility] facility, it is important to learn how to keep safe from harming yourself.    Recognize the warning signs:  · Abrupt changes in personality, positive or negative- including increase in energy   · Giving away possessions  · Change in eating patterns- significant weight changes-  positive or negative  · Change in sleeping patterns- unable to sleep or sleeping all the time   · Unwillingness or inability to communicate  · Depression  · Unusual sadness, discouragement and loneliness  · Talk of  wanting to die  · Neglect of personal appearance   · Rebelliousness- reckless behavior  · Withdrawal from people/activities they love  · Confusion- inability to concentrate     If you or a loved one observes any of these behaviors or has concerns about self-harm, here's what you can do:  · Talk about it- your feelings and reasons for harming yourself  · Remove any means that you might use to hurt yourself (examples: pills, rope, extension cords, firearm)  · Get professional help from the community (Mental Health, Substance Abuse, psychological counseling)  · Do not be alone:Call your Safe Contact- someone whom you trust who will be there for you.  · Call your local CRISIS HOTLINE 690-4110 or 998-120-9544  · Call your local Children's Mobile Crisis Response Team [place] (583) 986-1607 or www.[place] [employer]  · Call the toll free National Suicide Prevention Hotlines   · National Suicide Prevention Lifeline 167-358-XROD (1540)  · National Hope Line Network 800-SUICIDE (794-5806)

## 2019-06-14 NOTE — DISCHARGE SUMMARY
Discharge Summary    CHIEF COMPLAINT ON ADMISSION  Chief Complaint   Patient presents with   • Near Syncopal       Reason for Admission  Dizziness     Admission Date  6/13/2019    CODE STATUS  Full Code    HPI & HOSPITAL COURSE  This is a 79 y.o. Male with a past medical history of Ménière's disease and left ear deafness here with complaints of persistent dizziness and syncope.  The patient has a history of intermittent vertigo especially with turning his head secondary to his history of Ménière's disease.  This is a result of multiple head traumas with history of bull riding.  As of recently the patient has been experiencing more persistent dizziness and has had one episode of syncope.      On admission CT head was negative for acute intracranial abnormality.  MRI brain showed a 4.1 x 2.7 cm arachnoid cyst located anteriorly in right middle cranial fossa causing mild chronic effacement of the right anterior temporal lobe, but no acute lesion, hemorrhage, or infarction.  Carotid ultrasound was negative for severe stenosis.  CTA chest was negative for pulmonary embolism.  Echocardiogram was stable.  EEG was normal.    The patient was found to be positive for orthostatic hypotension on evaluation by physical therapy.  He was initiated on IV fluid resuscitation.  He was monitored in the hospital overnight.  He was evaluated again after receiving appropriate IV hydration.  He now does not show evidence of orthostatic hypotension.  He has had overall improvement of his dizziness with standing and ambulating.  He does continue to have intermittent vertigo with head positioning although this is chronic for this patient.    At this time the patient is medically stable.  He has been educated on the need to maintain adequate daily fluid intake of 2 L/day.  He is also recommended to change positions very slowly.  He currently has no further need for acute intervention.  He is safe for discharge at this time and will follow  closely with his outpatient physicians.       Therefore, he is discharged in good and stable condition to home with close outpatient follow-up.    Discharge Date  6/14/2019    DISCHARGE DIAGNOSES  Principal Problem:    Syncope, dizziness POA: Yes  Active Problems:    Meniere disease, bilateral POA: Yes    Orthostatic hypotension POA: Yes  Resolved Problems:    * No resolved hospital problems. *      FOLLOW UP  Future Appointments  Date Time Provider Department Center   6/20/2019 11:20 AM Orly Carty M.D. [address]       MEDICATIONS ON DISCHARGE     Medication List      CONTINUE taking these medications      Instructions   CENTRUM SILVER 50+MEN Tabs   Take 1 Tab by mouth every day.  Dose:  1 Tab     cetirizine 10 MG Tabs  Commonly known as:  ZYRTEC   Take 10 mg by mouth 2 times a day as needed for Allergies.  Dose:  10 mg     dimenhyDRINATE 50 MG Tabs  Commonly known as:  DRAMAMINE   Take 1 Tab by mouth every 6 hours.  Dose:  50 mg            Allergies  Allergies   Allergen Reactions   • Oxycodone Unspecified     dizzy   • Percocet [Apap-Fd&C Red #40 [place]-Oxycodone]        DIET  Orders Placed This Encounter   Procedures   • Diet Order Regular     Standing Status:   Standing     Number of Occurrences:   1     Order Specific Question:   Diet:     Answer:   Regular [1]       ACTIVITY  As tolerated.  Weight bearing as tolerated      LABORATORY  Lab Results   Component Value Date    SODIUM 136 06/14/2019    POTASSIUM 4.0 06/14/2019    CHLORIDE 107 06/14/2019    CO2 24 06/14/2019    GLUCOSE 107 (H) 06/14/2019    BUN 16 06/14/2019    CREATININE 1.11 06/14/2019        Lab Results   Component Value Date    WBC 5.7 06/14/2019    HEMOGLOBIN 13.7 (L) 06/14/2019    HEMATOCRIT 41.1 (L) 06/14/2019    PLATELETCT 171 06/14/2019

## 2019-06-14 NOTE — PROGRESS NOTES
Pt refusing to wear non-skid socks while ambulating to the restroom. Pt educated on importance of wearing them, pt still refusing. RN aware.

## 2019-06-14 NOTE — CARE PLAN
Problem: Knowledge Deficit  Goal: Knowledge of disease process/condition, treatment plan, diagnostic tests, and medications will improve  Outcome: PROGRESSING AS EXPECTED      Problem: Safety  Goal: Will remain free from injury  Outcome: PROGRESSING AS EXPECTED    Goal: Will remain free from falls  Outcome: PROGRESSING AS EXPECTED      Problem: Mobility  Goal: Risk for activity intolerance will decrease  Outcome: PROGRESSING AS EXPECTED

## 2019-06-14 NOTE — PROGRESS NOTES
Orthostatics:    Supine 147/94 Pulse 88  Sitting 150/96 Pulse 96  Standing 134/90 Pulse 94    Pt reported dizziness when sitting up.

## 2019-06-14 NOTE — PROGRESS NOTES
Assessment completed. Pt A&Ox4. Respirations are even and unlabored on RA. Pt reports headache at this time, refused any medication. Coffee provided per request. Monitors applied, VS stable, call light and belongings within reach. POC updated (PT/OT, fluids). Pt educated on room and call light, pt verbalized understanding. Communication board updated. Needs met.

## 2019-06-15 NOTE — PROGRESS NOTES
IV Dc 'd. Discharge instructions provided to patient and daughter. Pt verbalizes understanding. Pt states all questions have been answered. Copy of DC provided to patient. Signed copy in chart. 2 prescriptions sent to pharmacy. Pt states all personal belongings are in possession. Pt escorted off unit by this RN without incident. Patient refused w/c.

## 2019-06-17 ENCOUNTER — PATIENT OUTREACH (OUTPATIENT)
Dept: HEALTH INFORMATION MANAGEMENT | Facility: OTHER | Age: 79
End: 2019-06-17

## 2019-06-20 ENCOUNTER — OFFICE VISIT (OUTPATIENT)
Dept: MEDICAL GROUP | Facility: MEDICAL CENTER | Age: 79
End: 2019-06-20
Payer: MEDICARE

## 2019-06-20 VITALS
BODY MASS INDEX: 24.92 KG/M2 | WEIGHT: 178 LBS | DIASTOLIC BLOOD PRESSURE: 72 MMHG | TEMPERATURE: 97.8 F | HEIGHT: 71 IN | HEART RATE: 84 BPM | OXYGEN SATURATION: 94 % | SYSTOLIC BLOOD PRESSURE: 126 MMHG | RESPIRATION RATE: 14 BRPM

## 2019-06-20 DIAGNOSIS — Z09 HOSPITAL DISCHARGE FOLLOW-UP: ICD-10-CM

## 2019-06-20 DIAGNOSIS — I95.1 ORTHOSTATIC HYPOTENSION: ICD-10-CM

## 2019-06-20 DIAGNOSIS — H81.03 MENIERE DISEASE, BILATERAL: ICD-10-CM

## 2019-06-20 PROBLEM — Z87.828 HISTORY OF TRAUMATIC HEAD INJURY: Status: ACTIVE | Noted: 2019-06-20

## 2019-06-20 PROCEDURE — 99203 OFFICE O/P NEW LOW 30 MIN: CPT | Performed by: FAMILY MEDICINE

## 2019-06-20 NOTE — PATIENT INSTRUCTIONS
If you need further assistance, or have any questions; concerns or lingering symptoms before seeing your Primary Care Provider or specialist.     Do not hesitate to contact us.     Please contact us at the Post-Hospital Follow Up Program at [phone] and ask for the Medical Assistant for Dr Carty.   Our offices hours are Monday-Friday 8 am-5 pm.

## 2019-06-20 NOTE — PROGRESS NOTES
Subjective:     Tao Petit is a 79 y.o. male who presents for Hospital Follow-up.  Chart and discharge summary reviewed.   Date of discharge 6/14/2019.  48- hour post discharge RN call not completed.       HPI: Recently hospitalized for severe dizziness and one episode of syncope.  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  He also has a history of multiple head traumas for many years in the [place].  Echocardiogram was unremarkable.  MRI of the brain was notable for an arachnoid cyst, also present on brain MRI from January 2016.  Carotid Doppler studies showed mild bilateral internal carotid artery stenosis less than 50%.  CTA of the chest was negative for PE.  Head CT showed no acute intracranial process but also mentioned a stable incidental right middle cranial fossa arachnoid cyst.  EEG was normal.  The patient was found to have orthostatic hypotension on evaluation by physical therapy.  This was presumed due to dehydration and he was given IV fluids.  He was encouraged to maintain oral hydration.    Since returning home, patient reports feeling good.  His dizziness is mostly better.  He takes Dramamine when it comes on.     The patient has questions regarding hospitalization or discharge: All questions answered.  The patient denies weakness; denies difficulty taking care of self at home.  Patient is on no regular medications nor does he want to be.  He takes Dramamine when needed.  He says meclizine does not help at all.    Current Outpatient Prescriptions   Medication Sig Dispense Refill   • dimenhyDRINATE (DRAMAMINE) 50 MG Tab Take 1 Tab by mouth every 6 hours. 60 Tab 0   • meclizine (ANTIVERT) 12.5 MG Tab Take 1 Tab by mouth 3 times a day. 90 Tab 1   • Multiple Vitamins-Minerals (CENTRUM SILVER 50+MEN) Tab Take 1 Tab by mouth every day.     • cetirizine (ZYRTEC) 10 MG Tab Take 10 mg by mouth 2  times a day as needed for Allergies.       No current facility-administered medications for this visit.        Allergies:   Oxycodone and Percocet [apap-fd&c red #40 [place]-oxycodone]    Social History:  Social History   Substance Use Topics   • Smoking status: Former Smoker     Quit date: 3/1/1965   • Smokeless tobacco: Current User     Types: Chew   • Alcohol use 3.5 oz/week     7 Cans of beer per week      Comment: 1 beer per night        Family History:  History reviewed. No pertinent family history.    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   • Pneumothorax on left 2/20/2013       Surgical History  Past Surgical History:   Procedure Laterality Date   • LUMBAR DECOMPRESSION  1/4/2017    Procedure: LUMBAR DECOMPRESSION - MINIMALLY INVASIVE L3-4 LEE LAMINECTOMY;  Surgeon: Jonn Zuniga M.D.;  Location: SURGERY [facility];  Service:    • IRRIGATION & DEBRIDEMENT ORTHO Left 8/3/2016    Procedure: IRRIGATION & DEBRIDEMENT ORTHO;  Surgeon: Constantino Coleman M.D.;  Location: SURGERY [facility];  Service:    • CHEST TUBE INSERTION  2/2013   • INGUINAL HERNIA REPAIR  1960's    right   • APPENDECTOMY  1956       ROS:    Constitutional:  Denies fever, chills, night sweats, fatigue or malaise  HENT: Denies head congestion, ear pain or drainage.  Hearing loss as noted above.  Eyes: Denies visual changes, eye drainage or redness, eye pain  Neck: Denies pain, swollen glands, decreased range of motion  Lungs: Denies shortness of breath, wheezing, cough  Cardiovascular: Denies chest pain, orthopnea, lower extremity edema, palpitations  Abdominal: Denies abdominal pain, change in bowel or bladder habits, nausea or vomiting  Musculoskeletal: Denies back pain, joint pains, decreased range of motion  Endocrine: Denies unexplained weight changes, heat or  "cold intolerance, hair loss, polyuria or polydipsia  Neurological: Denies headache, confusion, focal weakness or numbness.  Intermittent dizziness as noted above.  He notes memory loss due to history of frequent head trauma.  Psychiatric: Denies depression, anxiety, insomnia       Objective:     /72 (BP Location: Left arm, Patient Position: Sitting, BP Cuff Size: Adult)   Pulse 84   Temp 36.6 °C (97.8 °F) (Temporal)   Resp 14   Ht 1.803 m (5' 11\")   Wt 80.7 kg (178 lb)   SpO2 94%      Physical Exam:    GEN:  Alert, oriented, in no distress  HEENT:  PERRLA, EOMI, TM's clear bilaterally, oropharynx clear without erythema or exudates.  NECK;  Supple without cervical adenopathy or thyromegaly.    LUNGS:  Clear to auscultation without rales, rhonci, or wheezes.  CV:  Heart RRR without murmurs or S3 or S4  EXT:  Without cyanosis, clubbing, or edema.  NEURO:  Cranial nerves II through XII intact.  Motor function and sensation intact.  SKIN: No rashes or suspicious lesions.  PSYCH:  Behavior is appropriate.      Assessment and Plan:     1. Hospital follow-up:   Hospitalization and results reviewed with patient. High risk conditions requiring teaching or care coordination were identified and addressed.The patient demonstrate understanding of admission and underlying conditions. The patient understands discharge instructions and when to seek medical attention. Medications reviewed including instructions regarding high risk medications, dosing and side effects.    The patient is able to safely adhere to ADL/IADL, treatment and medication regimen, self-manage of high-risk conditions? Yes  The patient requires physical therapy/home health/DME referral? No   The patient requires referral to care coordination/behavioral health/social work?  No   Patient requires referral for pharmacy consult? No   Advance directive/POLST on file?  No   Required counseling on advance directive?  Yes , he is counseled regarding the " "importance of advanced directive.  He states that his daughters know his wishes.    2. Meniere disease, bilateral  -He continues to live with this and to use Dramamine as needed.  Changes positions slowly.    3. Orthostatic hypotension  -He will keep well-hydrated.        Medication Reconciliation  Medication list at end of encounter:   Current Outpatient Prescriptions   Medication Sig Dispense Refill   • dimenhyDRINATE (DRAMAMINE) 50 MG Tab Take 1 Tab by mouth every 6 hours. 60 Tab 0   • meclizine (ANTIVERT) 12.5 MG Tab Take 1 Tab by mouth 3 times a day. 90 Tab 1   • Multiple Vitamins-Minerals (CENTRUM SILVER 50+MEN) Tab Take 1 Tab by mouth every day.     • cetirizine (ZYRTEC) 10 MG Tab Take 10 mg by mouth 2 times a day as needed for Allergies.       No current facility-administered medications for this visit.        Primary care follow-up:  The patient is adamant that he does not want a PCP. He says he has had a great life and is \"ready to die\" whenever it happens.      Patient Instruction  Patient provided with educational material on discharge diagnosis and management of symptoms/red flags. Patient instructed to keep follow-up appointments and to bring written questions and and actual medications to each office visit. Patient instructed to call PCP/specialist with any problems/questions/concerns. Patient verbalizes understanding and has no further questions at this time.        "

## 2019-07-14 ENCOUNTER — PATIENT OUTREACH (OUTPATIENT)
Dept: HEALTH INFORMATION MANAGEMENT | Facility: OTHER | Age: 79
End: 2019-07-14

## 2019-07-25 NOTE — PROGRESS NOTES
A 79-year-old male was an emergent admission to [facility] from 6/13/2019 to 6/14/2019 to treat Syncope and collapse. IHD visited the patient bedside. The patient was discharged Home.  The patient was not under clinical case management.     The Patient was discharged with the following medication: Meclizine. The patient successfully filled the medication.     The patient was ordered to follow-up with PCP. The patient had the following appointment: 6/20/2019 @ 11:20 Orly Carty, general/family practice - CONFIRMED AS KEPT. The patient has no future appointments scheduled.     [facility] followed the patient for a total of 34 days and Patient and patient's family were engaged. Low LACE score, no PPS conducted.